# Patient Record
(demographics unavailable — no encounter records)

---

## 2017-03-16 NOTE — EMERGENCY ROOM VISIT NOTE
ED Visit Note


First contact with patient:  12:19


CHIEF COMPLAINT: Right rib injury





HISTORY OF PRESENT ILLNESS: This 21-year-old female presents to the ER with 

chief complaint of right rib injury.  The patient states that while she was in 

[place] last week on spring break she fell backwards and a chair landed on 

top of her.  She states it struck the anterior lower ribs.  Since that time she 

has pain in the ribs especially when she moves or coughs.  The patient has had 

head congestion and cough since last week.  She does admit to some slight body 

aches but denies any fever, sore throat or ear pain.


  


REVIEW OF SYSTEMS:  6 system review was performed and was negative unless 

stated otherwise in history of present illness.





PMH:  The patient is healthy; there is no significant medical or surgical 

history.





SOCIAL HISTORY:  Patient is a [employer] student.  The patient admits to 

tobacco use and occasional alcohol use.


 


PHYSICAL EXAM: Vital Signs were reviewed: Temperature 36.6, blood pressure 107/

70, pulse 80 respirations 18 Reviewed Nurse's notes and agree.  Oxygen 

saturation is 99 % on room air  which is normal  . GENERAL: 20-year-old male 

appears in no acute distress. MENTAL STATUS: Alert, oriented, coherent. EARS: 

Canals clear. TMs good light reflex, no erythema or fluid level noted. NOSE: 

Nasal mucosa with moderate erythema engorgement. PHARYNX: No erythema, no edema 

noted. No exudate noted. Airway is adequate. NECK: Supple, non-tender. No 

lymphadenopathy noted. LUNGS: Clear to auscultation without wheezes rales or 

rhonchi. CARDIAC: Regular rate and rhythm without murmur.  CHEST WALL: No 

erythema, ecchymosis noted.  There is tenderness palpation over the right 

anterior lower chest wall otherwise chest wall is nontender.  SKIN: No rashes 

noted.  





EMERGENCY COURSE: The patient was evaluated.  The patient was given Motrin 400 

mg by mouth for pain.  X-ray of the right ribs to include PA chest was ordered 

and interpreted by the radiologist and myself.





DIAGNOSTICS:RIGHT RIBS UNILATERAL WITH PA CHEST





CLINICAL HISTORY: right anterior rib pain/injury


Right    





COMPARISON STUDY:  None





FINDINGS: Negative right ribs. No evidence for fracture.





Lungs are clear. No evidence for pneumothorax.





IMPRESSION:  


1. Negative right ribs.


2. Negative chest. 














Electronically signed by:  Damaso Lee M.D.


3/16/2017 1:11 PM





Dictated Date/Time:  3/16/2017 1:10 PM





 The status of this report is Signed.   


 Draft = Not yet reviewed or approved by Radiologist.  





The patient was informed of the findings and discharged home in stable 

condition.








DIAGNOSIS: Right rib contusion


URI





TREATMENT and DISCHARGE INSTRUCTIONS: Ibuprofen 400 mg every 6 hours with food 

for pain.  Over-the-counter symptomatic treatment for your cold symptoms.  Push 

fluids, rest.  If symptoms persist or worsen, follow-up with [facility].


Current/Historical Medications


No Active Prescriptions or Reported Meds





Allergies


Coded Allergies:  


     No Known Allergies (Unverified , 3/16/17)





Vital Signs











  Date Time  Temp Pulse Resp B/P Pulse Ox O2 Delivery O2 Flow Rate FiO2


 


3/16/17 12:06 36.6 80 18 107/70 99 Room Air  











Medications Administered











 Medications


  (Trade)  Dose


 Ordered  Sig/Frank


 Route  Start Time


 Stop Time Status Last Admin


Dose Admin


 


 Ibuprofen


  (Advil Tab)  400 mg  NOW  STAT


 PO  3/16/17 12:27


 3/16/17 12:28 DC 3/16/17 12:33


400 MG











Departure Information


Prescriptions





No Active Prescriptions or Reported Meds





Referrals


No Doctor, Assigned (PCP)





Patient Instructions


[facility]– [place]

## 2017-03-16 NOTE — DIAGNOSTIC IMAGING REPORT
RIGHT RIBS UNILATERAL WITH PA CHEST



CLINICAL HISTORY: right anterior rib pain/injury

Right    



COMPARISON STUDY:  None



FINDINGS: Negative right ribs. No evidence for fracture.



Lungs are clear. No evidence for pneumothorax.



IMPRESSION:  

1. Negative right ribs.

2. Negative chest. 









Electronically signed by:  Damaso Lee M.D.

3/16/2017 1:11 PM



Dictated Date/Time:  3/16/2017 1:10 PM